# Patient Record
Sex: FEMALE | Race: BLACK OR AFRICAN AMERICAN
[De-identification: names, ages, dates, MRNs, and addresses within clinical notes are randomized per-mention and may not be internally consistent; named-entity substitution may affect disease eponyms.]

---

## 2018-10-14 ENCOUNTER — HOSPITAL ENCOUNTER (EMERGENCY)
Dept: HOSPITAL 72 - EMR | Age: 33
Discharge: HOME | End: 2018-10-14
Payer: MEDICAID

## 2018-10-14 VITALS — DIASTOLIC BLOOD PRESSURE: 65 MMHG | SYSTOLIC BLOOD PRESSURE: 103 MMHG

## 2018-10-14 VITALS — HEIGHT: 68 IN | WEIGHT: 293 LBS | BODY MASS INDEX: 44.41 KG/M2

## 2018-10-14 VITALS — SYSTOLIC BLOOD PRESSURE: 103 MMHG | DIASTOLIC BLOOD PRESSURE: 65 MMHG

## 2018-10-14 DIAGNOSIS — J45.901: Primary | ICD-10-CM

## 2018-10-14 PROCEDURE — 94640 AIRWAY INHALATION TREATMENT: CPT

## 2018-10-14 PROCEDURE — 94664 DEMO&/EVAL PT USE INHALER: CPT

## 2018-10-14 PROCEDURE — 99284 EMERGENCY DEPT VISIT MOD MDM: CPT

## 2018-10-14 NOTE — EMERGENCY ROOM REPORT
History of Present Illness


General


Chief Complaint:  Upper Respiratory Illness


Source:  Patient





Present Illness


HPI


33-year-old female presents to the emergency department complaining of 

persistent cough 1 week.  Patient also reports wheezing and feeling " tight/

constricted in the chest/breathing."  Patient reports she has a history of 

asthma and she has been out of her inhaler so she has been attempting to use 

her child's inhaler but she is not getting any relief except for very temporary 

if any.  Patient denies fevers or chills she denies runny nose, sore throat, 

headache, neck pain or stiffness.  Patient states she also has had a lot of 

phlegm with her cough mainly at nighttime.  Patient denies recent travel or ill 

contacts.  Patient denies chest pain or palpitations.


Allergies:  


Coded Allergies:  


     PENICILLINS (Verified  Allergy, Unknown, 4/13/14)





Patient History


Past Medical History:  see triage record


Past Surgical History:  none


Pertinent Family History:  none


Last Menstrual Period:  10/14/18


Pregnant Now:  No


Reviewed Nursing Documentation:  PMH: Agreed; PSxH: Agreed





Nursing Documentation-PMH


Past Medical History:  No History, Except For


Hx Cardiac Problems:  No - anemia


Hx Hypertension:  No


Hx Pacemaker:  No


Hx Asthma:  Yes


Hx COPD:  No


Hx Diabetes:  No


Hx Cancer:  No


Hx Gastrointestinal Problems:  No


Hx Dialysis:  No


Hx Neurological Problems:  No


Hx Cerebrovascular Accident:  No


Hx Seizures:  No





Review of Systems


All Other Systems:  negative except mentioned in HPI





Physical Exam





Vital Signs








  Date Time  Temp Pulse Resp B/P (MAP) Pulse Ox O2 Delivery O2 Flow Rate FiO2


 


10/14/18 19:46 98.7 92 18 103/65 97 Room Air  





 98.8       








Sp02 EP Interpretation:  reviewed, normal


General Appearance:  no apparent distress, alert, GCS 15, non-toxic


Head:  normocephalic, atraumatic


Eyes:  bilateral eye normal inspection, bilateral eye PERRL


ENT:  hearing grossly normal, normal voice


Neck:  full range of motion


Respiratory:  chest non-tender, lungs clear, speaking full sentences, wheezing 

- bilaterally


Cardiovascular #1:  regular rate, rhythm, no edema


Musculoskeletal:  back normal, gait/station normal, normal range of motion, non-

tender


Neurologic:  normal inspection, alert, oriented x3, responsive, motor strength/

tone normal, sensory intact, normal gait, speech normal, grossly normal


Psychiatric:  judgement/insight normal


Skin:  normal color, no rash, warm/dry, well hydrated


Lymphatic:  no adenopathy





Medical Decision Making


PA Attestation


Dr. Holguin is my supervising Physician whom patient management has been 

discussed with.


Diagnostic Impression:  


 Primary Impression:  


 Asthma exacerbation


 Qualified Codes:  J45.901 - Unspecified asthma with (acute) exacerbation


ER Course


Pt. presents to the ED c/o cough and wheezing x 2 days, Pt. has a hx of asthma, 

and inhaler treatments at home are not helping as she is out of her inhaler and 

has been using her ba. 





Ddx considered but are not limited to asthma exacerbation, CHF, URI, pneumonia, 

PE, strep pharyngitis, meningitis.





Vital signs: Pt. is afebrile,  VS are WNL


H&PE are most consistent with  asthma exacerbation





ORDERS: none required at this time, the diagnosis is clinical





ED INTERVENTIONS: 


-Duo Nebs


-Prednisone PO


- re-examination post nebulized treatment lungs are CTA bilaterally. 





DISCHARGE: At this time pt. is stable for d/c to home. Will provide printed 

patient care instructions, and any necessary prescriptions. Care plan and 

follow up instructions have been discussed with the patient prior to discharge.





Last Vital Signs








  Date Time  Temp Pulse Resp B/P (MAP) Pulse Ox O2 Delivery O2 Flow Rate FiO2


 


10/14/18 20:02 98.8 92 18 103/65 97 Room Air  





 98.8       








Disposition:  HOME, SELF-CARE


Condition:  Stable


Patient Instructions:  Acute Bronchitis, Easy-to-Read





Additional Instructions:  


Take medications as directed. 


 ** Follow up with a Primary Care Provider in 3-5 days, even if your symptoms 

have resolved. ** 


--Please review list of primary care clinics, if you do not already have a 

primary care provider





Return sooner to ED if new symptoms occur, or current symptoms become worse. 


Do not drink alcohol, drive, or operate heavy machinery while taking Cough 

Syrup as this may cause drowsiness. 











- Please note that this Emergency Department Report was dictated using [employer] technology software, occasionally this can lead to 

erroneous entry secondary to interpretation by the dictation equipment.











Lillian Borden Oct 14, 2018 20:10

## 2019-02-04 ENCOUNTER — HOSPITAL ENCOUNTER (EMERGENCY)
Dept: HOSPITAL 72 - EMR | Age: 34
Discharge: HOME | End: 2019-02-04
Payer: MEDICAID

## 2019-02-04 VITALS — SYSTOLIC BLOOD PRESSURE: 102 MMHG | DIASTOLIC BLOOD PRESSURE: 78 MMHG

## 2019-02-04 VITALS — HEIGHT: 68 IN | BODY MASS INDEX: 44.41 KG/M2 | WEIGHT: 293 LBS

## 2019-02-04 VITALS — SYSTOLIC BLOOD PRESSURE: 105 MMHG | DIASTOLIC BLOOD PRESSURE: 75 MMHG
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
## 2019-02-04 DIAGNOSIS — Z88.0: ICD-10-CM

## 2019-02-04 DIAGNOSIS — J45.909: ICD-10-CM

## 2019-02-04 DIAGNOSIS — S30.0XXA: Primary | ICD-10-CM
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
## 2019-02-04 PROCEDURE — 72020 X-RAY EXAM OF SPINE 1 VIEW: CPT

## 2019-02-04 PROCEDURE — 99283 EMERGENCY DEPT VISIT LOW MDM: CPT

## 2019-02-04 NOTE — NUR
ED Nurse Note:





pt walked in c/o low back pain, pt states she fell walking down stairs today, 
pt denies head injury or ha, pt AA&ox4, gcs=15, skin warm and dry, resp even 
and unlabored -n/v/d, ambulates w/ steady gait, noted tenderness on low back 
but no obvious deformity, contusion, or open wound, skin intact. will cont 
monitor.

## 2019-02-04 NOTE — EMERGENCY ROOM REPORT
History of Present Illness


General


Chief Complaint:  Pain


Source:  Patient





Present Illness


HPI


33-year-old female patient presents the ER complaining of low back pain status 

post fall.  Reports he was walking upstairs when she slipped and fell.  Denies 

dizziness or syncope.  Denies hitting her head or loss of consciousness.  

Reports that she hit her lower back on the stairs and scooted down a few 

stairs.  Reports this happened a few hours ago.  States not taking medication 

for relief of symptoms.  Denies bowel or bladder incontinence.  Denies pain 

rating down legs.  Reports able to ambulate without difficulty. Denies 

pregnancy.


Allergies:  


Coded Allergies:  


     PENICILLINS (Verified  Allergy, Unknown, 2/4/19)





Patient History


Past Medical History:  see triage record


Last Menstrual Period:  JAN 2019


Reviewed Nursing Documentation:  PMH: Agreed; PSxH: Agreed





Nursing Documentation-PMH


Past Medical History:  No History, Except For


Hx Cardiac Problems:  No - anemia


Hx Hypertension:  No


Hx Pacemaker:  No


Hx Asthma:  Yes


Hx COPD:  No


Hx Diabetes:  No


Hx Cancer:  No


Hx Gastrointestinal Problems:  No


Hx Dialysis:  No


Hx Neurological Problems:  No


Hx Cerebrovascular Accident:  No


Hx Seizures:  No





Review of Systems


All Other Systems:  negative except mentioned in HPI





Physical Exam





Vital Signs








  Date Time  Temp Pulse Resp B/P (MAP) Pulse Ox O2 Delivery O2 Flow Rate FiO2


 


2/4/19 13:21 98.2 67 16 102/78 98 Room Air  








Sp02 EP Interpretation:  reviewed, normal


General Appearance:  well appearing, no apparent distress, alert, GCS 15, non-

toxic


Head:  normocephalic, atraumatic


Eyes:  bilateral eye normal inspection, bilateral eye PERRL


ENT:  hearing grossly normal, normal pharynx, no angioedema, normal voice, 

uvula midline, moist mucus membranes


Neck:  full range of motion


Respiratory:  lungs clear, normal breath sounds, no rhonchi, no respiratory 

distress, no accessory muscle use, no wheezing, speaking full sentences


Cardiovascular #1:  regular rate, rhythm, no edema


Musculoskeletal:  back normal, digits/nails normal, gait/station normal, normal 

range of motion, other - no bony depression, no deformity, no coccyx TTP, 

tender - lumbar spine, lumbosacral region


Neurologic:  alert, oriented x3, responsive, motor strength/tone normal, SLR 

negative, sensory intact


Psychiatric:  mood/affect normal


Skin:  no rash





Medical Decision Making


PA Attestation


Dr. Holguin is my supervising Physician whom patient management has been 

discussed with.


Diagnostic Impression:  


 Primary Impression:  


 Fall


 Additional Impression:  


 Back contusion


ER Course


Pt. presents to the ED c/o low back pain s/p fall, denies hitting head or loss 

of consciousness.





Ddx considered but are not limited to fracture, sprain, strain, contusion, 

dislocation.


No erythema, no warmth to touch, no fever, nontoxic appearing, low suspicion 

for septic joint.


Soft compartments, no pulselessness, no pallor, no paresthesias, low suspicion 

for compartment syndrome at this time.





Vital signs: are WNL, pt. is afebrile





Ordered X-ray and pain medication.





ER COURSE


Provided with pain medication and lidocaine patch..


An X-ray of the lumbar spine negative for acute disease per the preliminary 

reading.


Offered patient xray of sacral coccyx, patient declined, stated would return to 

ER later if she "changed" her mind.





Patient instructed on RICE method: rest, ice, compression, elevation.


Patient instructed on rest, ice and heat





Contact information for orthopedic urgent care provided, follow-up with urgent 

care if unable to followup with primary care provider and get referral to 

orthopedic specialist.


Followup with primary care provider. Discuss referral to ortho/pain management/

PT as needed. Discuss further imaging with MRI/CT as needed.


ER precautions given.





DISCHARGE:


-Rx provided for Ibuprofen for pain symptoms.


-Rx provided for Lidocaine patch.





At this time pt. is stable for d/c to home. Patient is resting comfortably, in 

no acute distress, nontoxic appearing, talking without difficulty.


Will provide printed patient care instructions, and any necessary prescriptions.


Patient instructed to follow with primary care provider in 3 - 5 days and to 

request further follow-up as needed.


Care plan and follow up instructions have been discussed with the patient prior 

to discharge.


Take medications as directed. 


Patient questions asked and answered.


Patient reports understanding and agreement to treatment plan.


ER precautions given, patient instructed to return to ER immediately for any 

new or worsening of symptoms.





- Please note that this Emergency Department Report was dictated using [email or web address] technology software, occasionally this can lead to 

erroneous entry secondary to interpretation by the dictation equipment.


Other X-Ray Diagnostic Results


Other X-Ray Diagnostic Results :  


   X-Ray ordered:  lumbar spine


   # of Views/Limited Vs Complete:  4 View


   Indication:  Pain


   EP Interpretation:  Yes


   PA Xray:  Interpretation reviewed, by supervising MD, and agrees with 

findings.


   Interpretation:  no dislocation, no soft tissue swelling, no fractures


   Impression:  No acute disease


   PA Scribthomas Text


Navarro Lemon PA-C





Last Vital Signs








  Date Time  Temp Pulse Resp B/P (MAP) Pulse Ox O2 Delivery O2 Flow Rate FiO2


 


2/4/19 13:21 98.2 67 16 102/78 98 Room Air  








Status:  improved


Disposition:  HOME, SELF-CARE


Condition:  Stable


Scripts


Lidocaine (Lidocaine) 1 Each Adh..patch


5 % TP DAILY for 7 Days, #7 PATCH


   Prov: Omar Lemon         2/4/19 


Ibuprofen* (MOTRIN*) 600 Mg Tablet


600 MG ORAL Q8H PRN for For Pain, #30 TAB 0 Refills


   Prov: Omar Lemon         2/4/19


Patient Instructions:  Back Pain, Adult, Easy-to-Read, Fall Prevention in the 

Home, Easy-to-Read





Additional Instructions:  


Patient instructed to follow up with primary care provider 3-5 and discuss 

further referral and imaging at that time.


Patient instructed on rest, ice and heat.


Do not take muscle relaxant prior to drinking, driving, or operating heavy 

machinery.


Take medications as directed. 


Patient questions asked and answered.


ER precautions given, patient instructed to return to ER immediately for any 

new or worsening of symptoms.








[facility]


[id number]


[place], [address]


[phone]


[email or web address]











Omar Lemon Feb 4, 2019 13:59

## 2019-02-04 NOTE — NUR
ED Nurse Note:



A/OX4. PT IS CLEARED BY KAREEM HARRELL. DC INSTRUCTION AND PRESCRITPIONS GIVEN, PT 
VERBALIZED UNDERSTANDING. IV AND ID WRIST BAND REMOVED. ALL BELONGINGS GIVEN. 
PT AMBULATED OUT OF ER WITH STEADY GAIT. DENIES PAIN AT THIS TIME.